# Patient Record
Sex: MALE | Race: WHITE | NOT HISPANIC OR LATINO | URBAN - METROPOLITAN AREA
[De-identification: names, ages, dates, MRNs, and addresses within clinical notes are randomized per-mention and may not be internally consistent; named-entity substitution may affect disease eponyms.]

---

## 2021-05-24 ENCOUNTER — EMERGENCY (EMERGENCY)
Facility: HOSPITAL | Age: 61
LOS: 1 days | Discharge: HOME | End: 2021-05-24
Attending: EMERGENCY MEDICINE | Admitting: EMERGENCY MEDICINE
Payer: COMMERCIAL

## 2021-05-24 VITALS
OXYGEN SATURATION: 100 % | TEMPERATURE: 98 F | RESPIRATION RATE: 16 BRPM | DIASTOLIC BLOOD PRESSURE: 99 MMHG | HEART RATE: 87 BPM | SYSTOLIC BLOOD PRESSURE: 156 MMHG

## 2021-05-24 VITALS
SYSTOLIC BLOOD PRESSURE: 168 MMHG | TEMPERATURE: 98 F | RESPIRATION RATE: 14 BRPM | WEIGHT: 240.08 LBS | DIASTOLIC BLOOD PRESSURE: 95 MMHG | HEART RATE: 79 BPM | OXYGEN SATURATION: 99 % | HEIGHT: 73 IN

## 2021-05-24 PROBLEM — Z00.00 ENCOUNTER FOR PREVENTIVE HEALTH EXAMINATION: Status: ACTIVE | Noted: 2021-05-24

## 2021-05-24 PROCEDURE — 99283 EMERGENCY DEPT VISIT LOW MDM: CPT

## 2021-05-24 RX ORDER — HYDROCORTISONE/PRAMOXINE 2.5 %-1 %
1 CREAM WITH APPLICATOR RECTAL
Qty: 1 | Refills: 0
Start: 2021-05-24 | End: 2021-05-30

## 2021-05-24 NOTE — ED PROVIDER NOTE - CARE PROVIDER_API CALL
Reina Alvarado)  ColonRectal Surgery; Surgery  [address] [place] [address]  Phone: [phone]  Fax: [phone]  Follow Up Time: 1-3 Days    Renan Baumann)  ColonRectal Surgery; Surgery  [address], [address]  [place] [address]  Phone: [phone]  Fax: [phone]  Follow Up Time: 1-3 Days    Asher Modi (DO)  ColonRectal Surgery  [address] [place] [address]  Phone: [phone]  Fax: [phone]  Follow Up Time: 1-3 Days

## 2021-05-24 NOTE — ED PROVIDER NOTE - CARE PROVIDERS DIRECT ADDRESSES
[email or web address]

## 2021-05-24 NOTE — ED ADULT NURSE NOTE - NSFALLRSKASSESASSIST_ED_ALL_ED
no Tetracycline Pregnancy And Lactation Text: This medication is Pregnancy Category D and not consider safe during pregnancy. It is also excreted in breast milk.

## 2021-05-24 NOTE — ED PROVIDER NOTE - CLINICAL SUMMARY MEDICAL DECISION MAKING FREE TEXT BOX
60 yo male with h/o HTN and hemorrhoids presents to the ED c/o painful, bleeding hemorrhoids since Saturday. Patient explains hes been having mild diarrhea since Saturday. States his wife is currently being treated for C dif,. Patient went to [facility] over the weekend, stool sample was send, results pending. Patient started on vanco at that time. Patient tried hemorrhoid cream with minimal improvement. no  blood thinners. Denies fever, chills, chest pain, sob, abd pain, N/V, UE/LE weakness or paresthesias. PE: nonthrombosed hemorrhoid, A/P: colorectal consult, dc with anusol HC

## 2021-05-24 NOTE — ED ADULT NURSE NOTE - NSIMPLEMENTINTERV_GEN_ALL_ED
Implemented All Universal Safety Interventions:  [place] to call system. Call bell, personal items and telephone within reach. Instruct patient to call for assistance. Room bathroom lighting operational. Non-slip footwear when patient is off stretcher. Physically safe environment: no spills, clutter or unnecessary equipment. Stretcher in lowest position, wheels locked, appropriate side rails in place.

## 2021-05-24 NOTE — ED PROVIDER NOTE - PATIENT PORTAL LINK FT
You can access the FollowMyHealth Patient Portal offered by [facility] by registering at the following website: [email or web address]. By joining [employer]’s FollowMyHealth portal, you will also be able to view your health information using other applications (apps) compatible with our system.

## 2021-05-24 NOTE — ED PROVIDER NOTE - NSFOLLOWUPINSTRUCTIONS_ED_ALL_ED_FT
Please purchase over the counter stool softener medications and take as instructed on the label: Colace and Senna  Continue with Sitz baths after each bowel movement and 3 times per day.     Bleeding with your bowel movement is to be expected. If bleeding continues despite holding pressure, seek medical attention.        Hemorrhoids    WHAT YOU NEED TO KNOW:    Hemorrhoids are swollen blood vessels inside your rectum (internal hemorrhoids) or on your anus (external hemorrhoids). Sometimes a hemorrhoid may prolapse. This means it extends out of your anus.    DISCHARGE INSTRUCTIONS:    Return to the emergency department if:   •You have severe pain in your rectum or around your anus.      •You have severe pain in your abdomen and you are vomiting.       •You have bleeding from your anus that soaks through your underwear.       Contact your healthcare provider if:   •You have frequent and painful bowel movements.      •Your hemorrhoid looks or feels more swollen than usual.       •You do not have a bowel movement for 2 days or more.       •You see or feel tissue coming through your anus.       •You have questions or concerns about your condition or care.      Medicines: You may need any of the following:   •A pad, cream, or ointment can help decrease pain, swelling, and itching.       •Stool softeners help treat or prevent constipation.       •NSAIDs, such as ibuprofen, help decrease swelling, pain, and fever. NSAIDs can cause stomach bleeding or kidney problems in certain people. If you take blood thinner medicine, always ask your healthcare provider if NSAIDs are safe for you. Always read the medicine label and follow directions.      •Take your medicine as directed. Contact your healthcare provider if you think your medicine is not helping or if you have side effects. Tell him or her if you are allergic to any medicine. Keep a list of the medicines, vitamins, and herbs you take. Include the amounts, and when and why you take them. Bring the list or the pill bottles to follow-up visits. Carry your medicine list with you in case of an emergency.      Manage your symptoms:   •Apply ice on your anus for 15 to 20 minutes every hour or as directed. Use an ice pack, or put crushed ice in a plastic bag. Cover it with a towel before you apply it to your anus. Ice helps prevent tissue damage and decreases swelling and pain.      •Take a sitz bath. Fill a bathtub with 4 to 6 inches of warm water. You may also use a sitz bath pan that fits inside a toilet bowl. Sit in the sitz bath for 15 minutes. Do this 3 times a day, and after each bowel movement. The warm water can help decrease pain and swelling.       •Keep your anal area clean. Gently wash the area with warm water daily. Soap may irritate the area. After a bowel movement, wipe with moist towelettes or wet toilet paper. Dry toilet paper can irritate the area.       Prevent hemorrhoids:   •Do not strain to have a bowel movement. Do not sit on the toilet too long. These actions can increase pressure on the tissues in your rectum and anus.       •Drink plenty of liquids. Liquids can help prevent constipation. Ask how much liquid to drink each day and which liquids are best for you.       •Eat a variety of high-fiber foods. Examples include fruits, vegetables, and whole grains. Ask your healthcare provider how much fiber you need each day. You may need to take a fiber supplement.              •Exercise as directed. Exercise, such as walking, may make it easier to have a bowel movement. Ask your healthcare provider to help you create an exercise plan.       •Do not have anal sex. Anal sex can weaken the skin around your rectum and anus.       •Avoid heavy lifting. This can cause straining and increase your risk for another hemorrhoid.       Follow up with your healthcare provider as directed: Write down your questions so you remember to ask them during your visits.        © Copyright [employer] 2021

## 2021-05-24 NOTE — ED PROVIDER NOTE - PROVIDER TOKENS
PROVIDER:[TOKEN:[[id number]],FOLLOWUP:[1-3 Days]],PROVIDER:[TOKEN:[[id number]],FOLLOWUP:[1-3 Days]],PROVIDER:[TOKEN:[[id number]],FOLLOWUP:[1-3 Days]]

## 2021-05-24 NOTE — ED PROVIDER NOTE - OBJECTIVE STATEMENT
60 yo male with h/o HTN and hemorrhoids presents to the ED c/o painful, bleeding hemorrhoids since Saturday. Patient explains hes been having mild diarrhea since Saturday. States his wife is currently being treated for C dif,. Patient went to  over the weekend, stool sample was send, results pending. Patient started on vanco at that time. Patient tried hemorrhoid cream with minimal improvement. no  blood thinners. Denies fever, chills, chest pain, sob, abd pain, N/V, UE/LE weakness or paresthesias.

## 2021-05-24 NOTE — ED PROVIDER NOTE - ATTENDING CONTRIBUTION TO CARE
60 yo male with hx of hemorrhoid co bleeding hemorrhoid for past few days. Denies fever, NV, abd pain or other symptom. PCP Rajesh Webb  PE VSS, afebrile, Abd soft nontender, rectal large external hemorrhoid with small amount of fresh blood.   No other mass or abn.   Imp - Hemorrhoid, plan - anusol HC, Colon and rectal surgery referral.      I performed a history and physical exam of the patient and discussed their management with the advanced care provider. I reviewed the advanced care provider's note and agree with the documented findings and plan of care. My medical decision making and objective findings are found above.

## 2021-05-24 NOTE — ED PROVIDER NOTE - PROGRESS NOTE DETAILS
Attempted to contact colorectal group Dr. Alvarado x 2, no call back. Patient prefers to be discharged at this point with outpatient follow up.

## 2021-05-24 NOTE — ED ADULT NURSE NOTE - OBJECTIVE STATEMENT
Pt came in for rectal bleeding, diarrhea and pain x 2 days now. Pt states that his wife was recently dx with C-diff and he started having diarrhea this past Saturday. Pt was seen by a MD yesterday and was started on antibiotic ( vancomycin ) - stool was sent for testing for C-diff but no result at the moment . Pt denies any vomiting No fever No chills No abdominal pain Pt came in for rectal bleeding, diarrhea and pain x 2 days now. Pt states that his wife was recently dx with C-diff and he started having diarrhea this past Saturday. Pt was seen by a MD yesterday and was started on antibiotic ( vancomycin ) - stool was sent for testing for C-diff but no result at the moment . Pt denies any vomiting No fever No chills No abdominal pain No dizziness

## 2021-05-27 ENCOUNTER — APPOINTMENT (OUTPATIENT)
Dept: COLORECTAL SURGERY | Facility: CLINIC | Age: 61
End: 2021-05-27
Payer: COMMERCIAL

## 2021-05-27 VITALS
DIASTOLIC BLOOD PRESSURE: 114 MMHG | HEIGHT: 73 IN | WEIGHT: 240 LBS | BODY MASS INDEX: 31.81 KG/M2 | TEMPERATURE: 97.8 F | SYSTOLIC BLOOD PRESSURE: 153 MMHG | OXYGEN SATURATION: 98 % | HEART RATE: 71 BPM

## 2021-05-27 DIAGNOSIS — K64.2 THIRD DEGREE HEMORRHOIDS: ICD-10-CM

## 2021-05-27 DIAGNOSIS — Z86.79 PERSONAL HISTORY OF OTHER DISEASES OF THE CIRCULATORY SYSTEM: ICD-10-CM

## 2021-05-27 DIAGNOSIS — K62.5 HEMORRHAGE OF ANUS AND RECTUM: ICD-10-CM

## 2021-05-27 DIAGNOSIS — Z80.0 FAMILY HISTORY OF MALIGNANT NEOPLASM OF DIGESTIVE ORGANS: ICD-10-CM

## 2021-05-27 DIAGNOSIS — K62.89 OTHER SPECIFIED DISEASES OF ANUS AND RECTUM: ICD-10-CM

## 2021-05-27 PROCEDURE — 99072 ADDL SUPL MATRL&STAF TM PHE: CPT

## 2021-05-27 PROCEDURE — 46600 DIAGNOSTIC ANOSCOPY SPX: CPT

## 2021-05-27 PROCEDURE — 99204 OFFICE O/P NEW MOD 45 MIN: CPT | Mod: 25

## 2021-05-27 NOTE — PROCEDURE
[FreeTextEntry1] : Anoscopy recommended as part of further workup.  Risks include bleeding and discomfort discussed.  Questions answered.  Pt provides verbal consent.\par \par Well lubricated anoscope inserted into anus.  All 4 quadrants examined.\par \par Anosocpy shows grade 3 int hem LLQ, moderately inflammed, no fissure.\par \par Pt tolerated procedure.

## 2021-05-27 NOTE — HISTORY OF PRESENT ILLNESS
[FreeTextEntry1] : Here for evaluation of hemorrhoids.\par \par 5 days ago he had a "bad BM" which resulted in prolapse of the hemorrhoid. This resulted in passage of a large amount of blood and rectal pain.  Stools have been loose for about a week. His wife is currently being treated for C. diff.  Pt actually had stool tests @ [facility] [place] (shows me portal results all of which were negative including c diff).  He subsequently went to [place] ER who diagnosed him with nonthrombosed tender hemorrhoids with some blood.  He was given topical hydrocoritsone.  Over the past 3 days, the pain has improved and bleeding has decreased significantly.  No fevers, no trouble urinating.  \par \par last screening colonoscopy 3-4 years was normal.  Pt reports that he is due next year.\par \par PMH\par hypertension\par \par Meds:\par Losartan\par \par Reports allergies to shelffish and iodine\par \par SH\par reports daily beer, non smoker\par \par Pt's father  of colon cancer @ 73

## 2021-05-27 NOTE — CONSULT LETTER
[Dear  ___] : Dear  [unfilled], [Consult Letter:] : I had the pleasure of evaluating your patient, [unfilled]. [( Thank you for referring [unfilled] for consultation for _____ )] : Thank you for referring [unfilled] for consultation for [unfilled] [Please see my note below.] : Please see my note below. [Consult Closing:] : Thank you very much for allowing me to participate in the care of this patient.  If you have any questions, please do not hesitate to contact me. [Sincerely,] : Sincerely, [FreeTextEntry2] : Rajesh Webb MD [FreeTextEntry3] : Brent Astorga MD FACS\par

## 2021-05-27 NOTE — PHYSICAL EXAM
[JVD] : no jugular venous distention  [Respiratory Effort] : normal respiratory effort [Normal Rate and Rhythm] : normal rate and rhythm [Alert] : alert [de-identified] : ND soft NT [de-identified] : no perianal erythema or induration.  Enlarged LLQ external hemorrhoids, not thrombosed.  Prolapsed internal hemorroid with mucosal ulceration. mild bleeding noted.  manually reducible, mild to moderate tenderness.  FORTINO shows normal tone, no mass. [de-identified] : NAD

## 2021-05-27 NOTE — ASSESSMENT
[FreeTextEntry1] : Prolapsing hemorrhoids (grade 3), rectal bleeding, rectal pain\par --Evidence of grade 3 internal hemorrhoids.  They were prolapsed at baseline.  Given this, would recommend hemorrhoidectomy with possible proctoplasty as part of definitive treatment.  Risks include bleeding, infection, fistula, pain, urinary retention, incontinence discussed.  Questions answered.  Pt wishes to proceed.\par --At this point, given that his symptoms are improving, can schedule in the elective setting.  However, if symptoms start to worsen again, he should call office and we will make arrangement to perform surgery in the more urgent setting. \par \par --Pt wishes to have procedure to be done at [place], but will accept [place] if necessary\par --PST's include CBC, Chem 7, EKG.  Given history of Hypertension, will have pt get medical clearance from his PCP for surgery

## 2021-05-27 NOTE — REVIEW OF SYSTEMS
[Recent Weight Gain (___ Lbs)] : recent [unfilled] ~Ulb weight gain [Negative] : Endocrine [FreeTextEntry7] : rectal bleeding

## 2021-06-10 PROBLEM — K64.9 UNSPECIFIED HEMORRHOIDS: Chronic | Status: ACTIVE | Noted: 2021-05-24

## 2021-06-10 PROBLEM — I10 ESSENTIAL (PRIMARY) HYPERTENSION: Chronic | Status: ACTIVE | Noted: 2021-05-24

## 2021-06-11 ENCOUNTER — NON-APPOINTMENT (OUTPATIENT)
Age: 61
End: 2021-06-11

## 2021-06-12 ENCOUNTER — APPOINTMENT (OUTPATIENT)
Dept: DISASTER EMERGENCY | Facility: CLINIC | Age: 61
End: 2021-06-12

## 2021-06-15 ENCOUNTER — APPOINTMENT (OUTPATIENT)
Dept: COLORECTAL SURGERY | Facility: HOSPITAL | Age: 61
End: 2021-06-15

## 2023-05-20 NOTE — ED PROVIDER NOTE - MDM PATIENT STATEMENT FOR ADDL TREATMENT
Patient with one or more new problems requiring additional work-up/treatment. impaired balance/decreased ROM/decreased strength